# Patient Record
Sex: FEMALE | Race: WHITE | NOT HISPANIC OR LATINO | URBAN - METROPOLITAN AREA
[De-identification: names, ages, dates, MRNs, and addresses within clinical notes are randomized per-mention and may not be internally consistent; named-entity substitution may affect disease eponyms.]

---

## 2018-02-08 ENCOUNTER — AMBULATORY - HEALTHEAST (OUTPATIENT)
Dept: CARDIAC REHAB | Facility: HOSPITAL | Age: 73
End: 2018-02-08

## 2018-02-08 DIAGNOSIS — I50.9 CHF (CONGESTIVE HEART FAILURE) (H): ICD-10-CM

## 2018-02-13 ENCOUNTER — AMBULATORY - HEALTHEAST (OUTPATIENT)
Dept: CARDIAC REHAB | Facility: HOSPITAL | Age: 73
End: 2018-02-13

## 2018-02-13 DIAGNOSIS — I50.22 CHRONIC SYSTOLIC CHF (CONGESTIVE HEART FAILURE) (H): ICD-10-CM

## 2018-02-13 DIAGNOSIS — I50.9 CHF (CONGESTIVE HEART FAILURE) (H): ICD-10-CM

## 2018-02-13 RX ORDER — LORAZEPAM 0.5 MG/1
0.5 TABLET ORAL EVERY 8 HOURS PRN
Status: SHIPPED | COMMUNITY
Start: 2018-02-13

## 2018-02-13 RX ORDER — ALBUTEROL SULFATE 90 UG/1
2 AEROSOL, METERED RESPIRATORY (INHALATION) EVERY 4 HOURS PRN
Status: SHIPPED | COMMUNITY
Start: 2018-02-13

## 2018-02-15 ENCOUNTER — AMBULATORY - HEALTHEAST (OUTPATIENT)
Dept: CARDIAC REHAB | Facility: HOSPITAL | Age: 73
End: 2018-02-15

## 2018-02-15 DIAGNOSIS — I50.22 CHRONIC SYSTOLIC CHF (CONGESTIVE HEART FAILURE) (H): ICD-10-CM

## 2018-02-20 ENCOUNTER — AMBULATORY - HEALTHEAST (OUTPATIENT)
Dept: CARDIAC REHAB | Facility: HOSPITAL | Age: 73
End: 2018-02-20

## 2018-02-20 DIAGNOSIS — I50.22 CHRONIC SYSTOLIC CHF (CONGESTIVE HEART FAILURE) (H): ICD-10-CM

## 2018-03-01 ENCOUNTER — AMBULATORY - HEALTHEAST (OUTPATIENT)
Dept: CARDIAC REHAB | Facility: HOSPITAL | Age: 73
End: 2018-03-01

## 2018-03-01 DIAGNOSIS — I50.22 CHRONIC SYSTOLIC CHF (CONGESTIVE HEART FAILURE) (H): ICD-10-CM

## 2018-03-08 ENCOUNTER — AMBULATORY - HEALTHEAST (OUTPATIENT)
Dept: CARDIAC REHAB | Facility: HOSPITAL | Age: 73
End: 2018-03-08

## 2018-03-08 DIAGNOSIS — I50.22 CHRONIC SYSTOLIC CHF (CONGESTIVE HEART FAILURE) (H): ICD-10-CM

## 2018-03-20 ENCOUNTER — AMBULATORY - HEALTHEAST (OUTPATIENT)
Dept: CARDIAC REHAB | Facility: HOSPITAL | Age: 73
End: 2018-03-20

## 2018-03-20 DIAGNOSIS — I50.22 CHRONIC SYSTOLIC CHF (CONGESTIVE HEART FAILURE) (H): ICD-10-CM

## 2018-03-20 RX ORDER — TIOTROPIUM BROMIDE 18 UG/1
18 CAPSULE ORAL; RESPIRATORY (INHALATION) DAILY PRN
Status: SHIPPED | COMMUNITY
Start: 2018-03-20

## 2018-03-27 ENCOUNTER — AMBULATORY - HEALTHEAST (OUTPATIENT)
Dept: CARDIAC REHAB | Facility: HOSPITAL | Age: 73
End: 2018-03-27

## 2018-03-27 DIAGNOSIS — I50.22 CHRONIC SYSTOLIC CHF (CONGESTIVE HEART FAILURE) (H): ICD-10-CM

## 2018-03-29 ENCOUNTER — AMBULATORY - HEALTHEAST (OUTPATIENT)
Dept: CARDIAC REHAB | Facility: HOSPITAL | Age: 73
End: 2018-03-29

## 2018-03-29 DIAGNOSIS — I50.22 CHRONIC SYSTOLIC CHF (CONGESTIVE HEART FAILURE) (H): ICD-10-CM

## 2018-04-05 ENCOUNTER — AMBULATORY - HEALTHEAST (OUTPATIENT)
Dept: CARDIAC REHAB | Facility: HOSPITAL | Age: 73
End: 2018-04-05

## 2018-04-05 DIAGNOSIS — I50.22 CHRONIC SYSTOLIC CHF (CONGESTIVE HEART FAILURE) (H): ICD-10-CM

## 2018-04-10 ENCOUNTER — AMBULATORY - HEALTHEAST (OUTPATIENT)
Dept: CARDIAC REHAB | Facility: HOSPITAL | Age: 73
End: 2018-04-10

## 2018-04-10 DIAGNOSIS — I50.22 CHRONIC SYSTOLIC CHF (CONGESTIVE HEART FAILURE) (H): ICD-10-CM

## 2018-04-12 ENCOUNTER — AMBULATORY - HEALTHEAST (OUTPATIENT)
Dept: CARDIAC REHAB | Facility: HOSPITAL | Age: 73
End: 2018-04-12

## 2018-04-12 DIAGNOSIS — I50.22 CHRONIC SYSTOLIC CHF (CONGESTIVE HEART FAILURE) (H): ICD-10-CM

## 2018-04-17 ENCOUNTER — AMBULATORY - HEALTHEAST (OUTPATIENT)
Dept: CARDIAC REHAB | Facility: HOSPITAL | Age: 73
End: 2018-04-17

## 2018-04-17 DIAGNOSIS — I50.22 CHRONIC SYSTOLIC CHF (CONGESTIVE HEART FAILURE) (H): ICD-10-CM

## 2018-08-21 ENCOUNTER — RECORDS - HEALTHEAST (OUTPATIENT)
Dept: ADMINISTRATIVE | Facility: OTHER | Age: 73
End: 2018-08-21

## 2021-06-01 VITALS — WEIGHT: 145 LBS

## 2021-06-01 VITALS — WEIGHT: 144 LBS

## 2021-06-01 VITALS — WEIGHT: 143 LBS

## 2021-06-01 VITALS — WEIGHT: 148 LBS

## 2021-06-01 VITALS — WEIGHT: 147 LBS

## 2021-06-01 VITALS — WEIGHT: 141 LBS

## 2021-06-01 VITALS — WEIGHT: 149 LBS

## 2021-06-16 PROBLEM — I47.20 VT (VENTRICULAR TACHYCARDIA) (H): Status: ACTIVE | Noted: 2018-04-17

## 2021-06-16 NOTE — PROGRESS NOTES
ITP ASSESSMENT   Assessment Day: Initial  Session Number: 1  Precautions: Low EF, PAD  Diagnosis: CHF  Risk Stratification: High  Referring Provider: Adalgisa Mccormick MD  EXERCISE  Exercise Assessment: Initial     Tolerated 10' of exercise on initial visit at 1.6 METs. Encouraged pt to start walking for home exercise as tolerated. Reports she is limited by PAD.                         Exercise Plan  Goals Next 30 days  ADL'S: Walk 2-3x/day 5 min  Leisure: Resume sewing  Work: Retired    Education Goals: All goals in this section met  Education Goals Met: Patient can state cardiac s/s and appropriate emergency response.;Has system for taking medication.;Medication review.    Exercise Prescription  Exercise Mode: Treadmill;Bike;Nustep;Arm Erg.;Hallway Walking  Frequency: 2x/week  Duration: 40'  Intensity / THR: 20-30 beats above resting heart rate  RPE 11-14  Progression / Met level: 1.6-2.1  Resistive Training?: Yes    Current Exercise (mins/week): 1    Interventions  Home Exercise:  Mode: Walk  Frequency: 2-3x/day  Duration: 5 min    Education Material : Educational videos;Provide written material;Offer educational classes;Individual education and counseling    Education Completed  Exercise Education Completed: Cardiac Anatomy;Signs and Symptoms;Medication review;RPE;Emergency Plan;Home Exercise;Warm up/cool down;FITT Principles;BP/HR Reponse to exercise;Benefits of Exercise;End point of exercise            Exercise Follow-up/Discharge  Follow up/Discharge: Pt is limited by PAD. Encouraged to take several short walks throughout the day. Discussed the benefits of walking through claudication pain NUTRITION  Nutrition Assessment: Initial    Nutrition Risk Factors:  Nutrition Risk Factors: NA    Nutrition Plan  Interventions  No Data Recorded  Other Nutrition Intervention: Diet Class;Therapist/Pt Discussion;Educational Videos;Provide with Written Material    Education Completed  Nutrition Education Completed: Risk  "factor overview;Low sodium diet    Goals  Nutrition Goals (Next 30 days): Patient will follow a low sodium diet;Patient will maintain current weight or gradual weight gain;Patient will follow a low saturated fat diet;Patient knows appropriate portion size    Goals Met  Nutrition Goals Met: Patient can identify their risk factors for CAD;Completed Nutritional Risk Screen;Provided Rate your Plate Survey;Reviewed Dietitian schedule    Height, Weight, and  BMI  Weight: 143 lb (64.9 kg)  Height: 5' 3\" (1.6 m)  BMI: 25.34    Nutrition Follow-up  Follow-up/Discharge: Issued diet survey. Reviewed low sodium diet       Other Risk Factors  Other Risk Factor Assessment: Initial    HTN Risk Factor: Hypertension    Pre Exercise BP: 143/67  Post Exercise BP: 149/65    Hypertension Plan  Goals  HTN Goals: Follow low sodium diet;Exercises regularly    Goals Met  HTN Goals Met: Take medication as prescribed    HTN Interventions  HTN Interventions: Diet consult;Therapist/patient discussion;Provide written material;Offer educational videos;Offer educational classes    HTN Education Completed  HTN Education Completed: Low sodium diet;Medication review;Risk factor overview    Tobacco Risk Factor: Tobacco    Initial Use:: 1 ppd  Current Use:: Smoke free    Tobacco Plan  Tobacco Goals  Tobacco Goals: Patient demonstrates understanding of tobacco cessation, no goals identified for the next 30 days    Goals Met  Tobacco Goals Met: Patient remain tobacco free    Tobacco Interventions  Tobacco Interventions: Therapist/patient discussion    Tobacco Education Completed  Tobacco Education Completed: Health benefits of tobacco cessation;Risk factor overview    Risk Factor Follow-up   Follow-up/Discharge: Pt has been tobacco free for 3 years. Reviewed all risk factors and importance of managing risk of CAD and managing sx of CHF PSYCHOSOCIAL  Psychosocial Assessment: Initial     [facility] Q of L Summary Score: 24    ROHIT-D Score: " 10    Psychosocial Risk Factor: Stress    Psychosocial Plan  Interventions  If ROHIT-D > 15 send letter to MD  Interventions: Offer Spiritual Care consult;Offer educational videos and classes;Provide written material;Individual education and counseling    Education Completed  Education Completed: Relaxation/Coping Techniques;Effects of stress on body    Goals  Goals (Next 30 days): Identify stressors;Practicing stress management skills    Goals Met  Goals Met: Identified Support system;Oriented to stress management classes    Psychosocial Follow-up  Follow-up/Discharge: Pt reports she uses Ativan PRN for anxiety. She has an appt set up to see a therapist. Reviewed importance of managing stress/anxiety and finding effective stress mgmt/coping techniques           Patient involved in Goal setting?: Yes    Signature: _____________________________________________________________    Date: __________________    Time: __________________

## 2021-06-16 NOTE — PROGRESS NOTES
ITP ASSESSMENT   Assessment Day: 30 Day  Session Number: 5  Precautions: Low EF  Diagnosis: CHF  Risk Stratification: High  Referring Provider: Adalgisa Mccormick MD  EXERCISE  Exercise Assessment: Reassessment                          Exercise Plan  Goals Next 30 days  ADL'S: Walk 2-3x/day 5 min  Leisure: Resume sewing  Work: Retired    Education Goals: All goals in this section met  Education Goals Met: Patient can state cardiac s/s and appropriate emergency response.;Has system for taking medication.;Medication review.                        Goals Met  Initial ADL's goals met: No home walking - goal not met  Initial Leisure goals met: Pt has not resumed sewing - goal not met  Initial Progression: Pt's progress has been limited by PAD pain, lightheadedness/dizziness, and recent illness    Exercise Prescription  Exercise Mode: Treadmill;Bike;Nustep;Arm Erg.;Hallway Walking  Frequency: 2x/week  Duration: 40'  Intensity / THR: 20-30 beats above resting heart rate  RPE 11-14  Progression / Met level: 2.5-3  Resistive Training?: Yes    Current Exercise (mins/week): 80    Interventions  Home Exercise:  Mode: Walk  Frequency: 2-3x/day  Duration: 5 to 10 min    Education Material : Educational videos;Provide written material;Offer educational classes;Individual education and counseling    Education Completed  Exercise Education Completed: Cardiac Anatomy;Signs and Symptoms;Medication review;RPE;Emergency Plan;Home Exercise;Warm up/cool down;FITT Principles;BP/HR Reponse to exercise;Benefits of Exercise;End point of exercise            Exercise Follow-up/Discharge  Follow up/Discharge: Pt is not doing home exercise. Limited by recent illness, lightheadedness/dizziness, and PAD pain. Encouraged short walks NUTRITION  Nutrition Assessment: Reassessment    Nutrition Risk Factors:  Nutrition Risk Factors: NA    Nutrition Plan  Interventions  Diet Consult: Declined  Other Nutrition Intervention: Diet Class;Therapist/Pt  "Discussion;Educational Videos;Provide with Written Material    Education Completed  Nutrition Education Completed: Low Saturated fat diet;Risk factor overview;Low sodium diet;Weight management    Goals  Nutrition Goals (Next 30 days): Patient will follow a low sodium diet;Patient will maintain current weight or gradual weight gain;Patient will follow a low saturated fat diet;Patient knows appropriate portion size    Goals Met  Nutrition Goals Met: Patient can identify their risk factors for CAD;Patient follows a low sodium diet;Completed Nutritional Risk Screen;Provided Rate your Plate Survey;Reviewed Dietitian schedule;Patient states following a low saturated fat diet    Height, Weight, and  BMI  Weight: 144 lb (65.3 kg)  Height: 5' 3\" (1.6 m)  BMI: 25.51    Nutrition Follow-up  Follow-up/Discharge: Patient declined dietitian appointment but did stated she might come to the class on label reading and the low sodium class.      Other Risk Factors  Other Risk Factor Assessment: Reassessment    HTN Risk Factor: Hypertension    Pre Exercise BP: 130/56  Post Exercise BP: 134/62 (Immediate recovery-lightheaded, 136/60)    Hypertension Plan  Goals  HTN Goals: Exercises regularly    Goals Met  HTN Goals Met: Follow low sodium diet;Take medication as prescribed    HTN Interventions  HTN Interventions: Diet consult;Therapist/patient discussion;Provide written material;Offer educational videos;Offer educational classes    HTN Education Completed  HTN Education Completed: Low sodium diet;Medication review;Risk factor overview    Tobacco Risk Factor: Tobacco    Initial Use:: 1 ppd  Current Use:: Smoke free    Tobacco Plan  Tobacco Goals  Tobacco Goals: Patient demonstrates understanding of tobacco cessation, no goals identified for the next 30 days    Goals Met  Tobacco Goals Met: Patient remain tobacco free    Tobacco Interventions  Tobacco Interventions: Therapist/patient discussion;Provide written material;Offer educational " "videos;Offer class on risk factor modification    Tobacco Education Completed  Tobacco Education Completed: Identifies triggers;Health benefits of tobacco cessation;Risk factor overview    Risk Factor Follow-up   Follow-up/Discharge: Pt remains tobacco free (Quit 3 years ago) Pt has attended some education classes, encouraged to continue PSYCHOSOCIAL  Psychosocial Assessment: Reassessment     [place] [facility] Q of L Summary Score: 24    ROHIT-D Score: 10    Psychosocial Risk Factor: Stress    Psychosocial Plan  Interventions  Interventions: Offer Spiritual Care consult;Offer educational videos and classes;Provide written material;Individual education and counseling    Education Completed  Education Completed: Relaxation/Coping Techniques;Effects of stress on body    Goals  Goals (Next 30 days): Practicing stress management skills    Goals Met  Goals Met: Identified Support system;Oriented to stress management classes;Identify stressors    Psychosocial Follow-up  Follow-up/Discharge: Pt states she is \"managing\".Encouraged stress mgmt class         Patient involved in Goal setting?: Yes    Signature: _____________________________________________________________    Date: __________________    Time: __________________  "

## 2021-06-16 NOTE — PROGRESS NOTES
Cardiac Rehab  Phase II Assessment    Assessment Date: 2/13/18    Diagnosis: Chronic Systolic CHF  ICD/Pacemaker: Yes Parameters:   Post-op Complications: NA  ECG History: Afib/AFlutter, freq runs of NSVT, Vtach, freq PVCs EF%: 25  Past Medical History: CHF/CM, HTN, Vtach, At fib, VT ablations, COPD, CAD, STEMI (1991), R total hip replacment, PAD    Physical Assessment  Precautions/ Physical Limitations: PAD, Low EF  Oxygen: No  O2 Sats:  Lung Sounds: Clear Edema: 0  Incisions: NA  Sleeping Pattern: good   Appetite: good   Nutrition Risk Screen: No nutrition risk at this time. Reviewed low sodium    Pain  Location:   Characteristics:  Intensity: (0-10 scale) 0  Current Pain Management:   Intervention:   Response:     Psychosocial/ Emotional Health  1. In the past 12 months, have you been in a relationship where you have been abused physically, emotionally, sexually or financially? No  notified: NA  2. Who do you turn to for emotional support?: Family  3. Do you have cultural or spiritual needs? No  4. Have there been any major life changes in the past 12 months? No    Referral Information  Primary Physician: Adalgisa Mccormick MD  Cardiologist: Yoan  Surgeon: DIDI    Home exercise/Equipment: None    Patient's long-term goal(s): Pt reports no long term goals at this time    1. Living Accommodations: Mobile home Steps: Yes - 4 steps to get inside      Support people at home: Son   2. Marital Status: single  3. Family is able to assist with cares      Restorationist/Community involvement: Involved with large family  4. Recreation/Hobbies: Spending time with grandchildren, book club, luncheons with friends, reading, sewing

## 2021-06-17 NOTE — PROGRESS NOTES
ITP ASSESSMENT   Assessment Day: 60 Day  Session Number: 9  Precautions: Low EF  Diagnosis: CHF  Risk Stratification: High  Referring Provider: Adalgisa Mccormick MD  EXERCISE  Exercise Assessment: Reassessment   Patient is exercising on the NuStep and Bike at a MET level of 2.4-2.6. She is tolerating well in Cardiac Rehab, but complains of lightheadedness at home. She did have an episode of an 11 beat run of wide complex tachycardia(rhythm strip faxed to Dr. Hernandez)                           Exercise Plan  Goals Next 30 days  ADL'S: Walk 2-3x/day as tolerated for 10 min  Leisure: Resume sewing  Work: Retired    Education Goals: All goals in this section met  Education Goals Met: Patient can state cardiac s/s and appropriate emergency response.;Has system for taking medication.;Medication review.                        Goals Met  30 day ADL'S goals met: Pt. states walking as tolerated-goal met  30 day Leisure goals met: Pt. has not resumed sewing-goal not met  30 Day Progression: Pt. limited by PAD, will continue to work on goals    Initial ADL's goals met: No home walking - goal not met  Initial Leisure goals met: Pt has not resumed sewing - goal not met  Initial Progression: Pt's progress has been limited by PAD pain, lightheadedness/dizziness, and recent illness    Exercise Prescription  Exercise Mode: Treadmill;Bike;Nustep;Arm Erg.;Hallway Walking  Frequency: 2x/week  Duration: 40  Intensity / THR: 20-30 beats above resting heart rate  RPE 11-14  Progression / Met level: 2.6-2.9  Resistive Training?: Yes    Current Exercise (mins/week): 80    Interventions  Home Exercise:  Mode: Walk  Frequency: 2-3X day  Duration: 10 min    Education Material : Educational videos;Provide written material;Offer educational classes;Individual education and counseling    Education Completed  Exercise Education Completed: Cardiac Anatomy;Signs and Symptoms;Medication review;RPE;Emergency Plan;Home Exercise;Warm up/cool down;FITT  "Principles;BP/HR Reponse to exercise;Benefits of Exercise;End point of exercise            Exercise Follow-up/Discharge  Follow up/Discharge: Pt. is doing some home walking as tolerated. Pt. will try to increase walking time NUTRITION  Nutrition Assessment: Reassessment    Nutrition Risk Factors:  Nutrition Risk Factors: NA    Nutrition Plan  Interventions  Diet Consult: Declined  Other Nutrition Intervention: Diet Class;Therapist/Pt Discussion;Educational Videos;Provide with Written Material    Education Completed  Nutrition Education Completed: Low Saturated fat diet;Low sodium diet;Weight management    Goals  Nutrition Goals (Next 30 days): Patient will follow a low sodium diet;Patient will maintain current weight or gradual weight gain;Patient will follow a low saturated fat diet;Patient knows appropriate portion size    Goals Met  Nutrition Goals Met: Patient can identify their risk factors for CAD;Patient follows a low sodium diet;Completed Nutritional Risk Screen;Provided Rate your Plate Survey;Reviewed Dietitian schedule;Patient states following a low saturated fat diet    Height, Weight, and  BMI  Weight: 147 lb (66.7 kg)  Height: 5' 3\" (1.6 m)  BMI: 26.05    Nutrition Follow-up       Other Risk Factors  Other Risk Factor Assessment: Reassessment    HTN Risk Factor: Hypertension    Pre Exercise BP: 132/56  Post Exercise BP: 138/60    Hypertension Plan  Goals  HTN Goals: Exercises regularly    Goals Met  HTN Goals Met: Follow low sodium diet;Take medication as prescribed    HTN Interventions  HTN Interventions: Diet consult;Therapist/patient discussion;Provide written material;Offer educational videos;Offer educational classes    HTN Education Completed  HTN Education Completed: Low sodium diet;Medication review;Risk factor overview    Tobacco Risk Factor: Tobacco    Initial Use:: 1 ppd  Current Use:: Smoke Free    Tobacco Plan  Tobacco Goals  Tobacco Goals: Patient demonstrates understanding of tobacco " cessation, no goals identified for the next 30 days    Goals Met  Tobacco Goals Met: Patient remain tobacco free    Tobacco Interventions  Tobacco Interventions: Therapist/patient discussion;Provide written material;Offer educational videos;Offer class on risk factor modification    Tobacco Education Completed  Tobacco Education Completed: Identifies triggers;Health benefits of tobacco cessation;Risk factor overview    Risk Factor Follow-up   Follow-up/Discharge: Pt. remains tobacco free   PSYCHOSOCIAL  Psychosocial Assessment: Reassessment     Psychosocial Risk Factor: Stress    Psychosocial Plan  Interventions  Interventions: Offer Spiritual Care consult;Offer educational videos and classes;Provide written material;Individual education and counseling    Education Completed  Education Completed: Relaxation/Coping Techniques;Effects of stress on body    Goals  Goals (Next 30 days): Practicing stress management skills    Goals Met  Goals Met: Identified Support system;Oriented to stress management classes;Identify stressors    Psychosocial Follow-up  Follow-up/Discharge: Pt. states she has a good support sysem and is managing stress well           Patient involved in Goal setting?: Yes    Signature: _____________________________________________________________    Date: __________________    Time: __________________See Doc Flowsheet

## 2021-06-17 NOTE — PROGRESS NOTES
Summary of Event:4/17/2018    Information called/faxed to MD/NP  Clinician Name:    Dispensation of Patient:  Sent to Emergency Room      Parameter On Arrival With Event At Departure   Time 0830 0900      Heart Rate 60 84 74   Rhythm paced Paced/2 runs 6 beat VT Paced   Blood Pressure 142 52 182/84 180/82   Oxygen Sats.  100    Blood Sugar mg/dl      Other:        Follow-up of Outcome: Code 9 called, pt transported via w/c to ED  See Doc Flowsheet

## 2021-06-18 NOTE — PROGRESS NOTES
Pt completed 12 cardiac rehab sessions. Pt worked up to 2.4 METS. Had not heard from pt, and pt was put on hold for 4 weeks. D/Cing rehab chart.